# Patient Record
Sex: FEMALE | Race: WHITE | HISPANIC OR LATINO | Employment: FULL TIME | URBAN - METROPOLITAN AREA
[De-identification: names, ages, dates, MRNs, and addresses within clinical notes are randomized per-mention and may not be internally consistent; named-entity substitution may affect disease eponyms.]

---

## 2021-08-10 ENCOUNTER — TRANSFERRED RECORDS (OUTPATIENT)
Dept: FAMILY MEDICINE | Facility: CLINIC | Age: 26
End: 2021-08-10

## 2021-08-10 LAB
CREATININE (EXTERNAL): 8.7 MG/DL (ref 8.6–10.3)
GFR ESTIMATED (EXTERNAL): 0 ML/MIN/1.73M2
GFR ESTIMATED (IF AFRICAN AMERICAN) (EXTERNAL): 0 ML/MIN/1.73M2
TSH SERPL-ACNC: 3.29 UIU/ML (ref 0.45–5.33)

## 2021-11-11 ENCOUNTER — OFFICE VISIT (OUTPATIENT)
Dept: FAMILY MEDICINE | Facility: CLINIC | Age: 26
End: 2021-11-11

## 2021-11-11 ENCOUNTER — TRANSFERRED RECORDS (OUTPATIENT)
Dept: FAMILY MEDICINE | Facility: CLINIC | Age: 26
End: 2021-11-11

## 2021-11-11 VITALS
HEART RATE: 106 BPM | HEIGHT: 62 IN | TEMPERATURE: 97.5 F | BODY MASS INDEX: 29.96 KG/M2 | DIASTOLIC BLOOD PRESSURE: 76 MMHG | SYSTOLIC BLOOD PRESSURE: 114 MMHG | WEIGHT: 162.8 LBS | OXYGEN SATURATION: 97 %

## 2021-11-11 DIAGNOSIS — Z76.89 ENCOUNTER TO ESTABLISH CARE: Primary | ICD-10-CM

## 2021-11-11 DIAGNOSIS — Z33.1 PREGNANCY, INCIDENTAL: ICD-10-CM

## 2021-11-11 DIAGNOSIS — Z83.49 FAMILY HISTORY OF HYPOTHYROIDISM: ICD-10-CM

## 2021-11-11 PROCEDURE — 99203 OFFICE O/P NEW LOW 30 MIN: CPT | Performed by: NURSE PRACTITIONER

## 2021-11-11 RX ORDER — CALCIUM CARBONATE 500 MG/1
1 TABLET, CHEWABLE ORAL 2 TIMES DAILY PRN
COMMUNITY
End: 2022-09-22

## 2021-11-11 RX ORDER — PRENATAL VIT/IRON FUM/FOLIC AC 27MG-0.8MG
1 TABLET ORAL DAILY
COMMUNITY
End: 2022-09-22

## 2021-11-11 ASSESSMENT — ANXIETY QUESTIONNAIRES
1. FEELING NERVOUS, ANXIOUS, OR ON EDGE: NOT AT ALL
3. WORRYING TOO MUCH ABOUT DIFFERENT THINGS: NOT AT ALL
IF YOU CHECKED OFF ANY PROBLEMS ON THIS QUESTIONNAIRE, HOW DIFFICULT HAVE THESE PROBLEMS MADE IT FOR YOU TO DO YOUR WORK, TAKE CARE OF THINGS AT HOME, OR GET ALONG WITH OTHER PEOPLE: NOT DIFFICULT AT ALL
GAD7 TOTAL SCORE: 1
2. NOT BEING ABLE TO STOP OR CONTROL WORRYING: NOT AT ALL
5. BEING SO RESTLESS THAT IT IS HARD TO SIT STILL: NOT AT ALL
6. BECOMING EASILY ANNOYED OR IRRITABLE: NOT AT ALL
7. FEELING AFRAID AS IF SOMETHING AWFUL MIGHT HAPPEN: NOT AT ALL

## 2021-11-11 ASSESSMENT — PATIENT HEALTH QUESTIONNAIRE - PHQ9: 5. POOR APPETITE OR OVEREATING: SEVERAL DAYS

## 2021-11-11 ASSESSMENT — MIFFLIN-ST. JEOR: SCORE: 1426.71

## 2021-11-11 NOTE — PROGRESS NOTES
"Problem(s) Oriented visit        SUBJECTIVE:                                                    Laureen Wong is a 26 year old female who presents to clinic today for the following health issues :    Generally healthy 26 year old female, 37w3d gestation who presents to establish care. Receiving prenatal care through [facility], denies concerns with pregnancy. Needs referral to OB for insurance purposes.    Family history of hypothyroidism, has annual thyroid studies performed. Previous care in [place] and [place], will have records released.    History of depression [id number], was on fluoxetine but she responds best to therapy. 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 If post partum anxiety/depression occur she would prefer to see a therapist.    Thinking about post partum contraception, considering Nexplanon.    Problem list, Medication list, Allergies, and Medical/Social/Surgical histories reviewed in EPIC and updated as appropriate.   Additional history: as documented    ROS:  Gen negative except as listed per HPI      OBJECTIVE:                                                    Physical Exam:    /76   Pulse 106   Temp 97.5  F (36.4  C) (Skin)   Ht 1.575 m (5' 2\")   Wt 73.8 kg (162 lb 12.8 oz)   SpO2 97%   BMI 29.78 kg/m      CONSTITUTIONAL: Alert non-toxic appearing female in no acute distress  RESPIRATORY: Lungs clear to auscultation, respirations unlabored  CV: Regular rate and rhythm, S1S2, no clicks, murmurs, rubs, or gallops; BLE without edema  PSYCHIATRIC: Pleasant and interactive, affect euthymic, makes appropriate eye contact, thought process logical       ASSESSMENT/PLAN:                                                          Laureen was seen today for new patient, referral and imm/inj.    Diagnoses and all orders for this visit:    Encounter to establish care    Generally doing well " without concerns. Will have records released from previous clinics.     Pregnancy, incidental  -     Ob/Gyn Referral; Future    Receiving prenatal care through [facility], denies concerns, pregnancy progressing well    Family history of hypothyroidism    Annual thyroid studies, will perform after pregnancy            The following health maintenance items are reviewed in Epic and correct as of today:  Health Maintenance   Topic Date Due     PREVENTIVE CARE VISIT  Never done     ADVANCE CARE PLANNING  Never done     HIV SCREENING  Never done     HEPATITIS C SCREENING  Never done     PAP  Never done     PHQ-2  Never done     DTAP/TDAP/TD IMMUNIZATION (2 - Td or Tdap) 10/05/2031     INFLUENZA VACCINE  Completed     COVID-19 Vaccine  Completed     Pneumococcal Vaccine: Pediatrics (0 to 5 Years) and At-Risk Patients (6 to 64 Years)  Aged Out     IPV IMMUNIZATION  Aged Out     MENINGITIS IMMUNIZATION  Aged Out     HEPATITIS B IMMUNIZATION  Aged Out       Patient Instructions   OB referral placed  See me annually for physical and thyroid studies       33 minutes spent on encounter on 11/11/21    LEXI Brower CNP  [facility]  Family Practice  [facility]  [phone]    For any issues my office # is [phone]

## 2021-11-12 ASSESSMENT — ANXIETY QUESTIONNAIRES: GAD7 TOTAL SCORE: 1

## 2021-11-12 ASSESSMENT — PATIENT HEALTH QUESTIONNAIRE - PHQ9: SUM OF ALL RESPONSES TO PHQ QUESTIONS 1-9: 3

## 2022-09-22 ENCOUNTER — OFFICE VISIT (OUTPATIENT)
Dept: FAMILY MEDICINE | Facility: CLINIC | Age: 27
End: 2022-09-22

## 2022-09-22 VITALS
DIASTOLIC BLOOD PRESSURE: 62 MMHG | BODY MASS INDEX: 21.77 KG/M2 | HEART RATE: 119 BPM | TEMPERATURE: 98 F | OXYGEN SATURATION: 98 % | SYSTOLIC BLOOD PRESSURE: 108 MMHG | WEIGHT: 119 LBS

## 2022-09-22 DIAGNOSIS — Z86.16 PERSONAL HISTORY OF COVID-19: ICD-10-CM

## 2022-09-22 DIAGNOSIS — Z83.49 FAMILY HISTORY OF HYPOTHYROIDISM: Primary | ICD-10-CM

## 2022-09-22 PROCEDURE — 36415 COLL VENOUS BLD VENIPUNCTURE: CPT | Performed by: NURSE PRACTITIONER

## 2022-09-22 PROCEDURE — 90471 IMMUNIZATION ADMIN: CPT | Mod: 59 | Performed by: NURSE PRACTITIONER

## 2022-09-22 PROCEDURE — 99213 OFFICE O/P EST LOW 20 MIN: CPT | Mod: 25 | Performed by: NURSE PRACTITIONER

## 2022-09-22 PROCEDURE — 90674 CCIIV4 VAC NO PRSV 0.5 ML IM: CPT | Performed by: NURSE PRACTITIONER

## 2022-09-22 NOTE — PATIENT INSTRUCTIONS
Labs and flu shot today    [facility]    Let me know if there are any further cancers on your mom's side

## 2022-09-22 NOTE — PROGRESS NOTES
Problem(s) Oriented visit        SUBJECTIVE:                                                    Laureen Wong is a 27 year old female who presents today for the following:    CC: Thyroid testing    Laureen has a family history of hypothyroidism in her mother and sister- she reports that episodes have been severe enough to be associated with psychosis in both her mother and sister. Therefore, she would like to make sure she has thyroid testing annually and is interested in seeing an endocrinologist, particularly if there are abnormalities.    She is generally asymptomatic from a hypothyroidism or thyrotoxicosis perspective. She is 9 months post-partum (son Oswaldo), currently nursing. Had some post-partum hair loss, resolved. Having more fluctuations in temperature, feeling more cold than she used to feel since having a baby.    Did have COVID and recovered a couple of weeks ago    HR elevated today- doesn't typically drink caffeine but did today.        ROS:  Gen, CV, GI, , MSK, neuro, endo, psych negative except as listed per HPI      OBJECTIVE:                                                    Physical Exam:    /62   Pulse 119   Temp 98  F (36.7  C)   Wt 54 kg (119 lb)   SpO2 98%   Breastfeeding Yes   BMI 21.77 kg/m      CONSTITUTIONAL: Alert non-toxic appearing female in no acute distress  HEENT: Neck supple without adenopathy, no palpable thyromegaly or nodularity  RESPIRATORY: Lungs clear to auscultation, respirations unlabored  CV: Regular rate and rhythm, S1S2, no clicks, murmurs, rubs, or gallops  NEUROLOGIC: No gross deficits  PSYCHIATRIC: Pleasant and interactive, affect euthymic, makes appropriate eye contact, thought process logical       ASSESSMENT/PLAN:                                                          Laureen was seen today for thyroid problem and covid concern.    Diagnoses and all orders for this visit:    Family history of hypothyroidism  -     Thyroxine (T4) Free  Direct  S  ([facility])  -     TSH ([facility])  -     Triiodothyronine (T3) ([facility])  -     Thyroid Peroxidase (TPO) Ab ([facility])  -     VENOUS COLLECTION    Checking labs today. Reviewed that hypothyroidism typically can be well managed in primary care, but if she would prefer to see an endocrinologist that I would be happy to refer her.    Personal history of COVID-19    Recovered, doing well    Other orders  -     INFLUENZA,INJ,MDCK,PF,QUAD >6MO([facility]AX-[facility])              Patient Instructions   Labs and flu shot today    [facility]    Let me know if there are any further cancers on your mom's side    22 minutes total time spent including chart review, face to face time, and documentation.     LEXI Brower CNP  [facility]  Family [facility]  [phone]    For any issues my office # is [phone]

## 2022-09-22 NOTE — LETTER
[facility] [address] [place]  [address]  Phone: [phone]    September 26, 2022      Laureen Wong  [address] [place] [id number]            Dear Laureen,     Here is a copy of your most recent labs. Your labs are within expected limits without significant abnormalities. There is no current evidence of hypothyroidism or Hashimoto's thyroiditis, though this can develop later on. If you develop symptoms, we would want you to be evaluated. Please let me know if you have questions or concerns.     Take care,   Christen Rea CNP         Results for orders placed or performed in visit on 09/22/22   Thyroxine (T4) Free  Direct  S ([facility])     Status: None   Result Value Ref Range    T4 Free 1.27 0.82 - 1.77 ng/dL    Narrative    Performed at:  01 - [facility] [address], [place], [place]  [id number]  : Jona Edwards MD, Phone:  [phone]   TSH ([facility])     Status: None   Result Value Ref Range    TSH 1.760 0.450 - 4.500 uIU/mL    Narrative    Performed at:  01 - [facility] [address], [place], [place]  [id number]  : Jona Edwards MD, Phone:  [phone]   Triiodothyronine (T3) ([facility])     Status: None   Result Value Ref Range    Triiodothyronine (T3) 115 71 - 180 ng/dL    Narrative    Performed at:  01 - [facility] [address], [place], [place]  [id number]  : Jona Edwards MD, Phone:  [phone]   Thyroid Peroxidase (TPO) Ab ([facility])     Status: None   Result Value Ref Range    Thyroid Peroxidase Antibody 12 0 - 34 IU/mL    Narrative    Performed at:  01 - [facility] [address], [place], [place]  [id number]  : Jona Edwards MD, Phone:  [phone]

## 2022-09-23 LAB
T3 SERPL-MCNC: 115 NG/DL (ref 71–180)
T4 FREE SERPL-MCNC: 1.27 NG/DL (ref 0.82–1.77)
THYROID PEROXIDASE AB: 12 IU/ML (ref 0–34)
TSH BLD-ACNC: 1.76 UIU/ML (ref 0.45–4.5)

## 2023-02-11 ENCOUNTER — HEALTH MAINTENANCE LETTER (OUTPATIENT)
Age: 28
End: 2023-02-11

## 2023-06-08 ENCOUNTER — OFFICE VISIT (OUTPATIENT)
Dept: FAMILY MEDICINE | Facility: CLINIC | Age: 28
End: 2023-06-08

## 2023-06-08 VITALS
OXYGEN SATURATION: 98 % | SYSTOLIC BLOOD PRESSURE: 114 MMHG | WEIGHT: 128 LBS | DIASTOLIC BLOOD PRESSURE: 70 MMHG | HEART RATE: 91 BPM | BODY MASS INDEX: 23.41 KG/M2

## 2023-06-08 DIAGNOSIS — R14.2 FLATULENCE, ERUCTATION AND GAS PAIN: Primary | ICD-10-CM

## 2023-06-08 DIAGNOSIS — R14.3 FLATULENCE, ERUCTATION AND GAS PAIN: Primary | ICD-10-CM

## 2023-06-08 DIAGNOSIS — R10.13 EPIGASTRIC PAIN: ICD-10-CM

## 2023-06-08 DIAGNOSIS — R14.1 FLATULENCE, ERUCTATION AND GAS PAIN: Primary | ICD-10-CM

## 2023-06-08 PROCEDURE — 99213 OFFICE O/P EST LOW 20 MIN: CPT

## 2023-06-08 NOTE — PROGRESS NOTES
"  Assessment & Plan     Flatulence, eructation and gas pain  Suspect ulcer present and/or H. Pylori given symptoms and history of ulcer   -patient to schedule EGD - aware she will require transportation to and from this appt as it does require sedation  - Adult GI  Referral - Procedure Only    Epigastric pain  -see above plan  - Adult GI  Referral - Procedure Only      Ordering of each unique test             No follow-ups on file.    LEXI Thrasher CNP  [facility]    Bri Garduno is a 27 year old, presenting for the following health issues:  Gastrointestinal Problem (Has a hx of stomach ulcers. Having similar sx for past day.)    HPI     Acute Illness  Acute illness concerns: stomach burning/cramping - periumbilical   Onset/Duration: last night  Symptoms: improves with eating, worse on empty stomach  Fever: No  Chills/Sweats: No  Headache (location?): No  Sinus Pressure: No  Conjunctivitis:  No  Ear Pain: no  Rhinorrhea: No  Congestion: No  Sore Throat: No  Cough: no  Wheeze: No  Decreased Appetite: No  Nausea: No  Vomiting: No  Diarrhea: YES  Dysuria/Freq.: No  Dysuria or Hematuria: No  Fatigue/Achiness: No  Sick/Strep Exposure: No  Therapies tried and outcome: None    Hx: \"sensitive stomach\" - cannot drink caffeine   Hx: stomach ulcer - 2019    Review of Systems   Constitutional, HEENT, cardiovascular, pulmonary, gi and gu systems are negative, except as otherwise noted.      Objective    /70   Pulse 91   Wt 58.1 kg (128 lb)   LMP 05/25/2023 (Exact Date)   SpO2 98%   Breastfeeding No   BMI 23.41 kg/m    Body mass index is 23.41 kg/m .  Physical Exam   GENERAL: healthy, alert and no distress  NECK: no adenopathy, no asymmetry, masses, or scars and thyroid normal to palpation  RESP: lungs clear to auscultation - no rales, rhonchi or wheezes  CV: regular rate and rhythm, normal S1 S2, no S3 or S4, no murmur, click or rub, no peripheral edema and peripheral pulses " strong  ABDOMEN: tenderness epigastric and umbilical, no organomegaly or masses and bowel sounds normal  MS: no gross musculoskeletal defects noted, no edema

## 2023-10-23 ENCOUNTER — MEDICAL CORRESPONDENCE (OUTPATIENT)
Dept: HEALTH INFORMATION MANAGEMENT | Facility: CLINIC | Age: 28
End: 2023-10-23
Payer: COMMERCIAL

## 2023-10-24 ENCOUNTER — TRANSCRIBE ORDERS (OUTPATIENT)
Dept: OTHER | Age: 28
End: 2023-10-24

## 2023-10-24 ENCOUNTER — TELEPHONE (OUTPATIENT)
Dept: ENDOCRINOLOGY | Facility: CLINIC | Age: 28
End: 2023-10-24
Payer: COMMERCIAL

## 2023-10-24 DIAGNOSIS — E03.9 HYPOTHYROIDISM, UNSPECIFIED: Primary | ICD-10-CM

## 2023-10-24 NOTE — TELEPHONE ENCOUNTER
Patient call:     Appointment type: new endocrine   Provider: see below   Return date:see below   Speciality phone number: [phone]  Additional appointment(s) needed:   Additional notes: LVM and sent myc x1       Schedule Urgent on call or VLAD within one week pregnancy with hypothyroid Milagro Hartmann RN on 10/24/2023 at 2:01 PM    Available:   11/6 [facility] virtual CSC   11/7 [facility] virtual CSC   11/7 [facility] in person WBSC   11/21 [facility] virtual CSC VLAD on call   11/22 [facility] virtual CSC VLAD on call  11/22 [facility] in person MG     Please note that the above appointment(s) will require manual scheduling as they are marked as VLAD and will not appear using auto search. Do not schedule the patient if another patient has already been scheduled in the requested appointment slot.       Kemi Alejandre on 10/24/2023 at 3:23 PM

## 2023-10-25 NOTE — CONFIDENTIAL NOTE
RECORDS RECEIVED FROM: internal /ce   DATE RECEIVED: 11.6.23   NOTES (FOR ALL VISITS) STATUS DETAILS   OFFICE NOTES from referring provider internal    Latisha Camp APRN CNM      OFFICE NOTES from other specialist [facility]- 10.13.23 [facility]  9.15.23 Te[place]     [facility] 9.22.22 [place]        MEDICATION LIST internal       LABS     DIABETES: HBGA1C, CREATININE, FASTING LIPIDS, MICROALBUMIN URINE, POTASSIUM, TSH, T4    THYROID: TSH, T4, CBC, THYRODLONULIN, TOTAL T3, FREE T4, CALCITONIN, CEA internal  TSH- 9.22.22   T4-  9.22.22   Thyroid Peroxid-  9.22.22   Thriiodothyronin-  9.22.22

## 2023-11-06 ENCOUNTER — VIRTUAL VISIT (OUTPATIENT)
Dept: ENDOCRINOLOGY | Facility: CLINIC | Age: 28
End: 2023-11-06
Payer: COMMERCIAL

## 2023-11-06 ENCOUNTER — PRE VISIT (OUTPATIENT)
Dept: ENDOCRINOLOGY | Facility: CLINIC | Age: 28
End: 2023-11-06

## 2023-11-06 DIAGNOSIS — Z83.49 FAMILY HISTORY OF HYPOTHYROIDISM: Primary | ICD-10-CM

## 2023-11-06 PROCEDURE — 99202 OFFICE O/P NEW SF 15 MIN: CPT | Mod: VID | Performed by: INTERNAL MEDICINE

## 2023-11-06 RX ORDER — PNV NO.95/FERROUS FUM/FOLIC AC 28MG-0.8MG
TABLET ORAL
COMMUNITY

## 2023-11-06 ASSESSMENT — PAIN SCALES - GENERAL: PAINLEVEL: NO PAIN (0)

## 2023-11-06 NOTE — NURSING NOTE
Is the patient currently in the state of [place]? YES    Visit mode:VIDEO    If the visit is dropped, the patient can be reconnected by: VIDEO VISIT: Send to e-mail at: [email or web address]    Will anyone else be joining the visit? NO  (If patient encounters technical issues they should call [phone]    How would you like to obtain your AVS? MyChart    Are changes needed to the allergy or medication list? No    Reason for visit: Consult    Kimber GUAMAN

## 2023-11-06 NOTE — PATIENT INSTRUCTIONS
If you have any questions, please do not hesitate to call clinic line at [phone] and ask for Endocrinology clinic.  If you need to fax, please fax to clinic fax number at [phone]    After clinic hours or weekends, please contact [phone] and ask for Endocrinologist-on call      Sincerely,    Robbin Espino MD  Endocrinology

## 2023-11-06 NOTE — PROGRESS NOTES
Endocrinology Note         Laureen is a 28 year old female presents today for concerning of hypothyroidism    HPI  Laureen is a 28 year old female presents today for consultation for concerning of hypothyroidism in the setting of pregnancy    She is currently  at 19 weeks by LMP with EDB of 3/28/23. This is her second pregnancy.    She follows with [facility]. Lab on 9/15/2023 showed TSH 4.17,  FT4 1.1, TPO Ab<1    Patients reports feeling well. Energy is ok. Has difficulty to sleep due to her son who is 2 years old. She denied chronic constipation, diarrhea and temperature intolerance. She gained weight appropriately with pregnancy.  She has thyroid test done yearly due to family history significant for thyroid disorder. Her TSH has always been normal. Mother and and two sisters have hypothyroidism.    Past Medical History  Past Medical History:   Diagnosis Date    Irritable bowel syndrome with diarrhea     managed with dietary and lifestyle changes    Major depressive disorder in full remission (H24)     0391-4543, was on fluoxetine, primarily in therapy     Allergies  Allergies   Allergen Reactions    Caffeine      Headaches and vomiting     Medications  Current Outpatient Medications   Medication Sig Dispense Refill    drospirenone-ethinyl estradiol (MAILE) 3-0.02 MG per tablet Take 1 tablet by mouth daily      Multiple Vitamin (MULTIVITAMIN ADULT PO)       Prenatal Vit-Fe Fumarate-FA (PRENATAL VITAMINS) 28-0.8 MG TABS Prenatal Vitamins       Family History  family history includes Coronary Artery Disease in her father; Endometrial Cancer in her maternal grandmother; Failure to thrive in her maternal grandfather; Heart Disease in her father; Hypertension in her father and mother; Hypothyroidism in her mother and sister; No Known Problems in her sister; Pituitary Disorder in her brother.    Social History  Social History     Tobacco Use    Smoking status: Never    Smokeless tobacco: Never    Substance Use Topics    Alcohol use: Not Currently     Comment: exceptionally rare- when not pregnant, probably 3-6 glasses of wine per year    Drug use: Never   No smoking or alcohol      ROS  10 points ROS were negative otherwise mentioned in HPI    Physical Exam  Limited due to virtual visit  Constitutional: no distress, comfortable, pleasant   Eyes: anicteric, normal extra-ocular movements, no lid lag or retraction  Musculoskeletal: no edema   Skin: no jaundice   Neurological: cranial nerves intact   Psychological: appropriate mood       RESULTS  I have personally reviewed labs and images. I also reviewed labs with patient and discussed the result and plan of care.    8/10/2021: TSH 3.29  9/26/2022: TSH 1.76  9/15/2023 - TSH 4.17,  FT4 1.1, TPO Ab<1    ASSESSMENT:    Laureen is a 28 year old female presents today for consultation for concerning of hypothyroidism in the setting of pregnancy    1) Concern of hypothyroidism: she is currently 19 weeks pregnant. Due to family hx of hypothyroidism, she did have yearly thyroid test which were normal. Recent lab on 9/15/2023 when she was in 12 weeks GA showed TSH 4.17,  FT4 1.1, TPO Ab<1. She is clinically euthyroid. I would not start her on thyroid replacement but will repeat lab this semester. Explained hypothyroidism in pregnancy. All questions answered.    PLAN:   - lab for TSH, FT4 this week    Joined the call at 11/6/2023, 9:01:11 am.  Left the call at 11/6/2023, 9:11:39 am.  You were on the call for 10 minutes 27 seconds .    External notes/medical records independently reviewed, labs and imaging independently reviewed, medical management and tests to be discussed/communicated to patient.    Time: I spent 25 minutes spent on the date of the encounter preparing to see patient (including chart review and preparation), obtaining and or reviewing additional medical history, performing a physical exam and evaluation, documenting clinical information in the  electronic health record, independently interpreting results, communicating results to the patient and coordinating care.    Robbin Espino MD  Division of Diabetes and Endocrinology  Department of Medicine

## 2023-11-06 NOTE — LETTER
2023       RE: Laureen Wong  [address] [facility] [address]     Dear Colleague,    Thank you for referring your patient, Laureen Wong, to the [facility] [place] [facility]. Please see a copy of my visit note below.         Endocrinology Note         Laureen is a 28 year old female presents today for concerning of hypothyroidism    HPI  Laureen is a 28 year old female presents today for consultation for concerning of hypothyroidism in the setting of pregnancy    She is currently  at 19 weeks by LMP with EDB of 3/28/23. This is her second pregnancy.    She follows with [facility]. Lab on 9/15/2023 showed TSH 4.17,  FT4 1.1, TPO Ab<1    Patients reports feeling well. Energy is ok. Has difficulty to sleep due to her son who is 2 years old. She denied chronic constipation, diarrhea and temperature intolerance. She gained weight appropriately with pregnancy.  She has thyroid test done yearly due to family history significant for thyroid disorder. Her TSH has always been normal. Mother and and two sisters have hypothyroidism.    Past Medical History  Past Medical History:   Diagnosis Date    Irritable bowel syndrome with diarrhea     managed with dietary and lifestyle changes    Major depressive disorder in full remission (H24)     4607-0017, was on fluoxetine, primarily in therapy     Allergies  Allergies   Allergen Reactions    Caffeine      Headaches and vomiting     Medications  Current Outpatient Medications   Medication Sig Dispense Refill    drospirenone-ethinyl estradiol (MAILE) 3-0.02 MG per tablet Take 1 tablet by mouth daily      Multiple Vitamin (MULTIVITAMIN ADULT PO)       Prenatal Vit-Fe Fumarate-FA (PRENATAL VITAMINS) 28-0.8 MG TABS Prenatal Vitamins       Family History  family history includes Coronary Artery Disease in her father; Endometrial Cancer in her maternal grandmother; Failure  to thrive in her maternal grandfather; Heart Disease in her father; Hypertension in her father and mother; Hypothyroidism in her mother and sister; No Known Problems in her sister; Pituitary Disorder in her brother.    Social History  Social History     Tobacco Use    Smoking status: Never    Smokeless tobacco: Never   Substance Use Topics    Alcohol use: Not Currently     Comment: exceptionally rare- when not pregnant, probably 3-6 glasses of wine per year    Drug use: Never   No smoking or alcohol      ROS  10 points ROS were negative otherwise mentioned in HPI    Physical Exam  Limited due to virtual visit  Constitutional: no distress, comfortable, pleasant   Eyes: anicteric, normal extra-ocular movements, no lid lag or retraction  Musculoskeletal: no edema   Skin: no jaundice   Neurological: cranial nerves intact   Psychological: appropriate mood       RESULTS  I have personally reviewed labs and images. I also reviewed labs with patient and discussed the result and plan of care.    8/10/2021: TSH 3.29  9/26/2022: TSH 1.76  9/15/2023 - TSH 4.17,  FT4 1.1, TPO Ab<1    ASSESSMENT:    Laureen is a 28 year old female presents today for consultation for concerning of hypothyroidism in the setting of pregnancy    1) Concern of hypothyroidism: she is currently 19 weeks pregnant. Due to family hx of hypothyroidism, she did have yearly thyroid test which were normal. Recent lab on 9/15/2023 when she was in 12 weeks GA showed TSH 4.17,  FT4 1.1, TPO Ab<1. She is clinically euthyroid. I would not start her on thyroid replacement but will repeat lab this semester. Explained hypothyroidism in pregnancy. All questions answered.    PLAN:   - lab for TSH, FT4 this week    Joined the call at 11/6/2023, 9:01:11 am.  Left the call at 11/6/2023, 9:11:39 am.  You were on the call for 10 minutes 27 seconds .    External notes/medical records independently reviewed, labs and imaging independently reviewed, medical management and  tests to be discussed/communicated to patient.    Time: I spent 25 minutes spent on the date of the encounter preparing to see patient (including chart review and preparation), obtaining and or reviewing additional medical history, performing a physical exam and evaluation, documenting clinical information in the electronic health record, independently interpreting results, communicating results to the patient and coordinating care.    Robbin Espino MD  Division of Diabetes and Endocrinology  Department of Medicine

## 2024-01-25 ENCOUNTER — OFFICE VISIT (OUTPATIENT)
Dept: FAMILY MEDICINE | Facility: CLINIC | Age: 29
End: 2024-01-25

## 2024-01-25 VITALS
WEIGHT: 157 LBS | SYSTOLIC BLOOD PRESSURE: 116 MMHG | OXYGEN SATURATION: 100 % | HEIGHT: 62 IN | HEART RATE: 122 BPM | BODY MASS INDEX: 28.89 KG/M2 | DIASTOLIC BLOOD PRESSURE: 74 MMHG

## 2024-01-25 DIAGNOSIS — L98.9 SKIN LESION: Primary | ICD-10-CM

## 2024-01-25 PROBLEM — E03.8 SUBCLINICAL HYPOTHYROIDISM: Status: ACTIVE | Noted: 2023-09-21

## 2024-01-25 PROBLEM — K30 FUNCTIONAL DYSPEPSIA: Status: ACTIVE | Noted: 2024-01-25

## 2024-01-25 PROCEDURE — 99213 OFFICE O/P EST LOW 20 MIN: CPT

## 2024-01-25 RX ORDER — CALCIUM CARBONATE 500 MG/1
1 TABLET, CHEWABLE ORAL 2 TIMES DAILY
COMMUNITY

## 2024-01-25 RX ORDER — PYRIDOXINE HCL (VITAMIN B6) 25 MG
25 TABLET ORAL DAILY
COMMUNITY

## 2024-01-25 ASSESSMENT — ANXIETY QUESTIONNAIRES
2. NOT BEING ABLE TO STOP OR CONTROL WORRYING: NOT AT ALL
GAD7 TOTAL SCORE: 1
1. FEELING NERVOUS, ANXIOUS, OR ON EDGE: NOT AT ALL
GAD7 TOTAL SCORE: 1
7. FEELING AFRAID AS IF SOMETHING AWFUL MIGHT HAPPEN: NOT AT ALL
IF YOU CHECKED OFF ANY PROBLEMS ON THIS QUESTIONNAIRE, HOW DIFFICULT HAVE THESE PROBLEMS MADE IT FOR YOU TO DO YOUR WORK, TAKE CARE OF THINGS AT HOME, OR GET ALONG WITH OTHER PEOPLE: NOT DIFFICULT AT ALL
3. WORRYING TOO MUCH ABOUT DIFFERENT THINGS: NOT AT ALL
6. BECOMING EASILY ANNOYED OR IRRITABLE: SEVERAL DAYS
5. BEING SO RESTLESS THAT IT IS HARD TO SIT STILL: NOT AT ALL

## 2024-01-25 ASSESSMENT — PATIENT HEALTH QUESTIONNAIRE - PHQ9
SUM OF ALL RESPONSES TO PHQ QUESTIONS 1-9: 2
5. POOR APPETITE OR OVEREATING: NOT AT ALL

## 2024-01-25 NOTE — PROGRESS NOTES
"  Assessment & Plan     Skin lesion  -this appears to be a benign lesion, could even be a clogged duct - continue to monitor for now, if worsening may need derm referral   -Red flags that warrant emergent evaluation discussed  -Patient verbalized understanding and is agreeable to the discussed plan of care.        BMI  Estimated body mass index is 28.6 kg/m  as calculated from the following:    Height as of this encounter: 1.578 m (5' 2.13\").    Weight as of this encounter: 71.2 kg (157 lb).       See Patient Instructions    No follow-ups on file.    Bri Garduno is a 28 year old, presenting for the following health issues:  Mole (Mole on right breast for years, noticed that it has become raised this week. Not painful/31 weeks pregnant)    HPI     Recent covid diagnosis 2 weeks ago - still not feeling well   Noticed mole change about 1-2 weeks ago when nursing son on right areola, son has good latch, no breast pain or tenderness no masses palpated in breast       Patient is pregnant and breast feeding currently       Review of Systems  Constitutional, HEENT, cardiovascular, pulmonary, gi and gu systems are negative, except as otherwise noted.      Objective    /74   Pulse (!) 122   Ht 1.578 m (5' 2.13\")   Wt 71.2 kg (157 lb)   LMP 05/25/2023 (Exact Date)   SpO2 100%   BMI 28.60 kg/m    Body mass index is 28.6 kg/m .  Physical Exam   GENERAL: alert and no distress  EYES: Eyes grossly normal to inspection, PERRL and conjunctivae and sclerae normal  HENT: ear canals and TM's normal, nose and mouth without ulcers or lesions  NECK: no adenopathy, no asymmetry, masses, or scars  RESP: lungs clear to auscultation - no rales, rhonchi or wheezes  CV: regular rate and rhythm, normal S1 S2, no S3 or S4, no murmur, click or rub, no peripheral edema  MS: no gross musculoskeletal defects noted, no edema  SKIN:         No results found for this or any previous visit (from the past 24 hour(s)).        Signed " Electronically by: LEXI Thrasher CNP

## 2024-02-24 ENCOUNTER — HEALTH MAINTENANCE LETTER (OUTPATIENT)
Age: 29
End: 2024-02-24

## 2025-03-09 ENCOUNTER — HEALTH MAINTENANCE LETTER (OUTPATIENT)
Age: 30
End: 2025-03-09

## 2025-05-30 ENCOUNTER — OFFICE VISIT (OUTPATIENT)
Dept: FAMILY MEDICINE | Facility: CLINIC | Age: 30
End: 2025-05-30

## 2025-05-30 ENCOUNTER — RESULTS FOLLOW-UP (OUTPATIENT)
Dept: FAMILY MEDICINE | Facility: CLINIC | Age: 30
End: 2025-05-30

## 2025-05-30 VITALS
OXYGEN SATURATION: 98 % | WEIGHT: 137 LBS | DIASTOLIC BLOOD PRESSURE: 56 MMHG | SYSTOLIC BLOOD PRESSURE: 110 MMHG | HEART RATE: 94 BPM | BODY MASS INDEX: 24.96 KG/M2

## 2025-05-30 DIAGNOSIS — Z00.00 ROUTINE GENERAL MEDICAL EXAMINATION AT A HEALTH CARE FACILITY: Primary | ICD-10-CM

## 2025-05-30 DIAGNOSIS — Z83.49 FAMILY HISTORY OF HYPOTHYROIDISM: ICD-10-CM

## 2025-05-30 DIAGNOSIS — Z82.49 FAMILY HISTORY OF ISCHEMIC HEART DISEASE: ICD-10-CM

## 2025-05-30 DIAGNOSIS — E03.8 SUBCLINICAL HYPOTHYROIDISM: ICD-10-CM

## 2025-05-30 LAB
ANION GAP SERPL CALCULATED.3IONS-SCNC: 11 MMOL/L (ref 7–15)
BUN SERPL-MCNC: 22.1 MG/DL (ref 6–20)
CALCIUM SERPL-MCNC: 9.5 MG/DL (ref 8.8–10.4)
CHLORIDE SERPL-SCNC: 102 MMOL/L (ref 98–107)
CHOLESTEROL: 199 MG/DL (ref 100–199)
CREAT SERPL-MCNC: 0.74 MG/DL (ref 0.51–0.95)
EGFRCR SERPLBLD CKD-EPI 2021: >90 ML/MIN/1.73M2
FASTING STATUS PATIENT QL REPORTED: NO
FASTING?: NO
GLUCOSE SERPL-MCNC: 101 MG/DL (ref 70–99)
HCO3 SERPL-SCNC: 27 MMOL/L (ref 22–29)
HDL (RMG): 47 MG/DL (ref 40–?)
LDL CALCULATED (RMG): 130 MG/DL (ref 0–130)
POTASSIUM SERPL-SCNC: 4.1 MMOL/L (ref 3.4–5.3)
SODIUM SERPL-SCNC: 140 MMOL/L (ref 135–145)
TRIGLYCERIDES (RMG): 106 MG/DL (ref 0–149)
TSH SERPL DL<=0.005 MIU/L-ACNC: 2.22 UIU/ML (ref 0.3–4.2)

## 2025-05-30 PROCEDURE — 3050F LDL-C >= 130 MG/DL: CPT

## 2025-05-30 PROCEDURE — 3078F DIAST BP <80 MM HG: CPT

## 2025-05-30 PROCEDURE — 99395 PREV VISIT EST AGE 18-39: CPT

## 2025-05-30 PROCEDURE — 36415 COLL VENOUS BLD VENIPUNCTURE: CPT

## 2025-05-30 PROCEDURE — 3074F SYST BP LT 130 MM HG: CPT

## 2025-05-30 PROCEDURE — 80061 LIPID PANEL: CPT | Mod: QW

## 2025-05-30 PROCEDURE — 80051 ELECTROLYTE PANEL: CPT

## 2025-05-30 PROCEDURE — 84443 ASSAY THYROID STIM HORMONE: CPT | Mod: 90

## 2025-05-30 PROCEDURE — 80048 BASIC METABOLIC PNL TOTAL CA: CPT | Mod: 90

## 2025-05-30 PROCEDURE — 84443 ASSAY THYROID STIM HORMONE: CPT

## 2025-05-30 PROCEDURE — 83695 ASSAY OF LIPOPROTEIN(A): CPT

## 2025-05-30 SDOH — HEALTH STABILITY: PHYSICAL HEALTH: ON AVERAGE, HOW MANY DAYS PER WEEK DO YOU ENGAGE IN MODERATE TO STRENUOUS EXERCISE (LIKE A BRISK WALK)?: 4 DAYS

## 2025-05-30 ASSESSMENT — SOCIAL DETERMINANTS OF HEALTH (SDOH): HOW OFTEN DO YOU GET TOGETHER WITH FRIENDS OR RELATIVES?: ONCE A WEEK

## 2025-05-30 NOTE — PROGRESS NOTES
Preventive Care Visit  [facility]  LEXI Thrasher CNP, Nurse Practitioner Primary Care  May 30, 2025      Assessment & Plan     Routine general medical examination at a health care facility  - Preventive health topics discussed including adequate exercise and healthy diet. Return to clinic in one year for preventative exam or sooner with any other concerns. Other issues discussed as noted below.       Subclinical hypothyroidism  - not on meds, will base plan on lab results.  Continue to check annually given family history.   - VENOUS COLLECTION  - TSH with free T4 reflex  - Basic metabolic panel  - TSH with free T4 reflex  - Basic metabolic panel    Family history of hypothyroidism  - Basic metabolic panel  - Basic metabolic panel    Family history of ischemic heart disease  - VENOUS COLLECTION  - Lipid Profile ([facility])  - Lipoprotein (a)  - Lipoprotein (a)      Patient has been advised of split billing requirements and indicates understanding: Yes        Counseling  Appropriate preventive services were addressed with this patient via screening, questionnaire, or discussion as appropriate for fall prevention, nutrition, physical activity, Tobacco-use cessation, social engagement, weight loss and cognition.  Checklist reviewing preventive services available has been given to the patient.  Reviewed patient's diet, addressing concerns and/or questions.       Follow-up  Return in about 53 weeks (around 6/5/2026) for Annual Wellness Visit.    Bri Garduno is a 29 year old, presenting for the following:  Physical (Due for CPX and routine thyroid check)       HPI       Family history of hypothyroidism, recent elevated TSH when pregnant, needs repeat testing.  Asymptomatic.     No other concerns today.    Annual Pap done with [facility]      Advance Care Planning    Did not have time to discuss         5/30/2025   General Health   How would you rate your overall physical health? Good   Feel stress (tense,  anxious, or unable to sleep) Not at all         5/30/2025   Nutrition   Three or more servings of calcium each day? Yes   Diet: Regular (no restrictions)   How many servings of fruit and vegetables per day? (!) 2-3   How many sweetened beverages each day? 0-1         5/30/2025   Exercise   Days per week of moderate/strenous exercise 4 days         5/30/2025   Social Factors   Frequency of gathering with friends or relatives Once a week   Worry food won't last until get money to buy more No   Food not last or not have enough money for food? No   Do you have housing? (Housing is defined as stable permanent housing and does not include staying outside in a car, in a tent, in an abandoned building, in an overnight shelter, or couch-surfing.) Yes   Are you worried about losing your housing? No   Lack of transportation? No   Unable to get utilities (heat,electricity)? No         5/30/2025   Dental   Dentist two times every year? Yes         Today's PHQ-2 Score:       5/30/2025     3:35 PM   PHQ-2 ( 1999 [employer])   Q1: Little interest or pleasure in doing things 0    Q2: Feeling down, depressed or hopeless 0    PHQ-2 Score 0    Q1: Little interest or pleasure in doing things Not at all   Q2: Feeling down, depressed or hopeless Not at all   PHQ-2 Score 0       Proxy-reported           5/30/2025   Substance Use   Alcohol more than 3/day or more than 7/wk No   Do you use any other substances recreationally? No     Social History     Tobacco Use    Smoking status: Never    Smokeless tobacco: Never   Substance Use Topics    Alcohol use: Not Currently     Comment: exceptionally rare- when not pregnant, probably 3-6 glasses of wine per year    Drug use: Never             5/30/2025   Breast Cancer Screening   Family history of breast, colon, or ovarian cancer? Yes         5/30/2025   LAST FHS-7 RESULTS   1st degree relative breast or ovarian cancer No   Any relative bilateral breast cancer No   Any male have breast cancer No   Any  "ONE woman have BOTH breast AND ovarian cancer No   Any woman with breast cancer before 50yrs Unknown   2 or more relatives with breast AND/OR ovarian cancer No   2 or more relatives with breast AND/OR bowel cancer No        Mammogram Screening - Patient under 40 years of age: Routine Mammogram Screening not recommended.           5/30/2025   One time HIV Screening   Previous HIV test? I don't know         5/30/2025   STI Screening   New sexual partner(s) since last STI/HIV test? No     History of abnormal Pap smear: No - age 30- 64 PAP with HPV every 5 years recommended        8/27/2020    12:00 AM   PAP / HPV   PAP-ABSTRACT See Scanned Document            5/30/2025   Contraception/Family Planning   Questions about contraception or family planning No        Reviewed and updated as needed this visit by Provider   Tobacco  Allergies  Meds  Problems               Past Medical History:   Diagnosis Date    Irritable bowel syndrome with diarrhea     managed with dietary and lifestyle changes    Major depressive disorder in full remission     [id number], was on fluoxetine, primarily in therapy     Lab work is in process      Review of Systems  Constitutional, HEENT, cardiovascular, pulmonary, gi and gu systems are negative, except as otherwise noted.     Objective    Exam  /56   Pulse 94   Wt 62.1 kg (137 lb)   SpO2 98%   BMI 24.96 kg/m     Estimated body mass index is 24.96 kg/m  as calculated from the following:    Height as of 1/25/24: 1.578 m (5' 2.13\").    Weight as of this encounter: 62.1 kg (137 lb).    Physical Exam  GENERAL: alert and no distress  EYES: Eyes grossly normal to inspection, PERRL and conjunctivae and sclerae normal  HENT: ear canals and TM's normal, nose and mouth without ulcers or lesions  NECK: no adenopathy, no asymmetry, masses, or scars  RESP: lungs clear to auscultation - no rales, rhonchi or wheezes  CV: regular rate and rhythm, normal S1 S2, no S3 or S4, no murmur, click or rub, " no peripheral edema  ABDOMEN: soft, nontender, no hepatosplenomegaly, no masses and bowel sounds normal  MS: no gross musculoskeletal defects noted, no edema  SKIN: no suspicious lesions or rashes  NEURO: Normal strength and tone, mentation intact and speech normal  PSYCH: mentation appears normal, affect normal/bright        Signed Electronically by: LEXI Thrasher CNP

## 2025-05-30 NOTE — PROGRESS NOTES
Preventive Care Visit  [facility]  LEXI Thrasher CNP, Nurse Practitioner Primary Care  May 30, 2025        Bri Garduno is a 29 year old, presenting for the following:  Thyroid Problem (Routine thyroid check. Fhx thyroid disease)           History of Present Illness       Hypothyroidism:     Since last visit, patient describes the following symptoms::  None    She eats 2-3 servings of fruits and vegetables daily.She consumes 0 sweetened beverage(s) daily.She exercises with enough effort to increase her heart rate 30 to 60 minutes per day.  She exercises with enough effort to increase her heart rate 3 or less days per week.   She is taking medications regularly.          5/30/2025   General Health   How would you rate your overall physical health? Good   Feel stress (tense, anxious, or unable to sleep) Not at all         5/30/2025   Nutrition   Three or more servings of calcium each day? Yes   Diet: Regular (no restrictions)   How many servings of fruit and vegetables per day? (!) 2-3   How many sweetened beverages each day? 0-1         5/30/2025   Exercise   Days per week of moderate/strenous exercise 4 days         5/30/2025   Social Factors   Frequency of gathering with friends or relatives Once a week   Worry food won't last until get money to buy more No   Food not last or not have enough money for food? No   Do you have housing? (Housing is defined as stable permanent housing and does not include staying outside in a car, in a tent, in an abandoned building, in an overnight shelter, or couch-surfing.) Yes   Are you worried about losing your housing? No   Lack of transportation? No   Unable to get utilities (heat,electricity)? No         5/30/2025   Dental   Dentist two times every year? Yes         Today's PHQ-2 Score:       5/30/2025     3:35 PM   PHQ-2 ( 1999 [employer])   Q1: Little interest or pleasure in doing things 0    Q2: Feeling down, depressed or hopeless 0    PHQ-2 Score 0    Q1:  Little interest or pleasure in doing things Not at all   Q2: Feeling down, depressed or hopeless Not at all   PHQ-2 Score 0       Proxy-reported           5/30/2025   Substance Use   Alcohol more than 3/day or more than 7/wk No   Do you use any other substances recreationally? No     Social History     Tobacco Use    Smoking status: Never    Smokeless tobacco: Never   Substance Use Topics    Alcohol use: Not Currently     Comment: exceptionally rare- when not pregnant, probably 3-6 glasses of wine per year    Drug use: Never             5/30/2025   Breast Cancer Screening   Family history of breast, colon, or ovarian cancer? Yes         5/30/2025   LAST FHS-7 RESULTS   1st degree relative breast or ovarian cancer No   Any relative bilateral breast cancer No   Any male have breast cancer No   Any ONE woman have BOTH breast AND ovarian cancer No   Any woman with breast cancer before 50yrs Unknown   2 or more relatives with breast AND/OR ovarian cancer No   2 or more relatives with breast AND/OR bowel cancer No             5/30/2025   One time HIV Screening   Previous HIV test? I don't know         5/30/2025   STI Screening   New sexual partner(s) since last STI/HIV test? No

## 2025-06-02 LAB — APO A-I SERPL-MCNC: <6 MG/DL

## 2025-06-02 NOTE — PATIENT INSTRUCTIONS
Patient Education   Preventive Care Advice   This is general advice given by our system to help you stay healthy. However, your care team may have specific advice just for you. Please talk to your care team about your preventive care needs.  Nutrition  Eat 5 or more servings of fruits and vegetables each day.  Try wheat bread, brown rice and whole grain pasta (instead of white bread, rice, and pasta).  Get enough calcium and vitamin D. Check the label on foods and aim for 100% of the RDA (recommended daily allowance).  Lifestyle  Exercise at least 150 minutes each week  (30 minutes a day, 5 days a week).  Do muscle strengthening activities 2 days a week. These help control your weight and prevent disease.  No smoking.  Wear sunscreen to prevent skin cancer.  Have a dental exam and cleaning every 6 months.  Yearly exams  See your health care team every year to talk about:  Any changes in your health.  Any medicines your care team has prescribed.  Preventive care, family planning, and ways to prevent chronic diseases.  Shots (vaccines)   HPV shots (up to age 26), if you've never had them before.  Hepatitis B shots (up to age 59), if you've never had them before.  COVID-19 shot: Get this shot when it's due.  Flu shot: Get a flu shot every year.  Tetanus shot: Get a tetanus shot every 10 years.  Pneumococcal, hepatitis A, and RSV shots: Ask your care team if you need these based on your risk.  Shingles shot (for age 50 and up)  General health tests  Diabetes screening:  Starting at age 35, Get screened for diabetes at least every 3 years.  If you are younger than age 35, ask your care team if you should be screened for diabetes.  Cholesterol test: At age 39, start having a cholesterol test every 5 years, or more often if advised.  Bone density scan (DEXA): At age 50, ask your care team if you should have this scan for osteoporosis (brittle bones).  Hepatitis C: Get tested at least once in your life.  STIs (sexually  transmitted infections)  Before age 24: Ask your care team if you should be screened for STIs.  After age 24: Get screened for STIs if you're at risk. You are at risk for STIs (including HIV) if:  You are sexually active with more than one person.  You don't use condoms every time.  You or a partner was diagnosed with a sexually transmitted infection.  If you are at risk for HIV, ask about PrEP medicine to prevent HIV.  Get tested for HIV at least once in your life, whether you are at risk for HIV or not.  Cancer screening tests  Cervical cancer screening: If you have a cervix, begin getting regular cervical cancer screening tests starting at age 21.  Breast cancer scan (mammogram): If you've ever had breasts, begin having regular mammograms starting at age 40. This is a scan to check for breast cancer.  Colon cancer screening: It is important to start screening for colon cancer at age 45.  Have a colonoscopy test every 10 years (or more often if you're at risk) Or, ask your provider about stool tests like a FIT test every year or Cologuard test every 3 years.  To learn more about your testing options, visit:   .  For help making a decision, visit:   https://bit.ly/pj71083.  Prostate cancer screening test: If you have a prostate, ask your care team if a prostate cancer screening test (PSA) at age 55 is right for you.  Lung cancer screening: If you are a current or former smoker ages 50 to 80, ask your care team if ongoing lung cancer screenings are right for you.  For informational purposes only. Not to replace the advice of your health care provider. Copyright   2023 [employer]. All rights reserved. Clinically reviewed by the [facility] Transitions Program. [employer] 228302 - REV 01/24.